# Patient Record
Sex: MALE | URBAN - METROPOLITAN AREA
[De-identification: names, ages, dates, MRNs, and addresses within clinical notes are randomized per-mention and may not be internally consistent; named-entity substitution may affect disease eponyms.]

---

## 2021-04-02 ENCOUNTER — APPOINTMENT (RX ONLY)
Dept: URBAN - METROPOLITAN AREA CLINIC 74 | Facility: CLINIC | Age: 36
Setting detail: DERMATOLOGY
End: 2021-04-02

## 2021-04-02 DIAGNOSIS — L21.8 OTHER SEBORRHEIC DERMATITIS: ICD-10-CM

## 2021-04-02 DIAGNOSIS — L65.9 NONSCARRING HAIR LOSS, UNSPECIFIED: ICD-10-CM

## 2021-04-02 PROCEDURE — 99203 OFFICE O/P NEW LOW 30 MIN: CPT

## 2021-04-02 PROCEDURE — ? TREATMENT REGIMEN

## 2021-04-02 PROCEDURE — ? PRESCRIPTION

## 2021-04-02 PROCEDURE — ? COUNSELING

## 2021-04-02 PROCEDURE — ? DIAGNOSIS COMMENT

## 2021-04-02 PROCEDURE — ? ADDITIONAL NOTES

## 2021-04-02 RX ORDER — KETOCONAZOLE 20 MG/ML
SHAMPOO, SUSPENSION TOPICAL
Qty: 1 | Refills: 3 | Status: ERX | COMMUNITY
Start: 2021-04-02

## 2021-04-02 RX ADMIN — KETOCONAZOLE: 20 SHAMPOO, SUSPENSION TOPICAL at 00:00

## 2021-04-02 ASSESSMENT — LOCATION SIMPLE DESCRIPTION DERM
LOCATION SIMPLE: SCALP
LOCATION SIMPLE: RIGHT SCALP

## 2021-04-02 ASSESSMENT — LOCATION DETAILED DESCRIPTION DERM
LOCATION DETAILED: LEFT SUPERIOR PARIETAL SCALP
LOCATION DETAILED: RIGHT CENTRAL FRONTAL SCALP

## 2021-04-02 ASSESSMENT — LOCATION ZONE DERM: LOCATION ZONE: SCALP

## 2021-04-02 NOTE — HPI: HAIR LOSS
How Did The Hair Loss Occur?: gradual in onset
How Severe Is Your Hair Loss?: moderate
What Hair Products Do You Use?: Diogenes
Additional History: Patient presents for hair loss today persisting for a year and reports slight irritation on scalp with no previous treatment.

## 2021-04-02 NOTE — PROCEDURE: TREATMENT REGIMEN
Otc Regimen: Viviscal/ nutrafol, Rogaine
Detail Level: Zone
Initiate Treatment: ketoconazole 2 % shampoo \\nSig: Apply to scalp TIW, let sit 5-10 minutes then rinse

## 2021-04-02 NOTE — PROCEDURE: ADDITIONAL NOTES
Detail Level: Simple
Render Risk Assessment In Note?: no
Additional Notes: Alopecia counseling -  reviewed this is a new chronic problem that is chronic and difficult to treat high high probability of resulting in worsening leading to baldness.  Although this is a benign condition it can result in anxiety, depression, low self esteem and social isolation \\n- discussed treatment options including rogaine 5%, finasteride, low level light therapy,  spironolactone, platelet rich plasma injections, hair transplant and non-FDA approved  treatments including oral minoxidil  \\nif pt decides to proceed with finasteride,  recommended serum PSA at baseline and new baseline at 6 months while on medication finasteride.  Pt may need periodic testing thereafter depending on age and risk factors for prostate cancer \\n- reviewed side effect profile and common and serious adverse reactions to include low blood pressure with special emphasis on sexual dysfunction, impotence, decreased libido and prostate and breast cancer risk associated with use of this medication \\n- discussed other treatment options for alopecia including Rogaine, low level light therapy caps, platelet rich plasma and hair transplant.   Reviewed other non FDA approved options including oral minoxidil,

## 2021-04-02 NOTE — PROCEDURE: DIAGNOSIS COMMENT
Comment: - onset over last few months with shedding, no  triggers\\n- rec nutrifol or viviscal\\n- treat seb derm to see if improved\\n- treatment options discussed in great detail
Detail Level: Simple
Render Risk Assessment In Note?: no